# Patient Record
Sex: MALE | Race: WHITE | NOT HISPANIC OR LATINO | Employment: UNEMPLOYED | URBAN - METROPOLITAN AREA
[De-identification: names, ages, dates, MRNs, and addresses within clinical notes are randomized per-mention and may not be internally consistent; named-entity substitution may affect disease eponyms.]

---

## 2021-03-01 ENCOUNTER — RECORDS - HEALTHEAST (OUTPATIENT)
Dept: LAB | Facility: CLINIC | Age: 21
End: 2021-03-01

## 2021-03-01 LAB
C REACTIVE PROTEIN LHE: 0.4 MG/DL (ref 0–0.8)
RHEUMATOID FACT SERPL-ACNC: <15 IU/ML (ref 0–30)

## 2021-03-02 LAB
ANA SER QL: 0.5 U
B BURGDOR IGG+IGM SER QL: 0.07 INDEX VALUE
T PALLIDUM AB SER QL: NEGATIVE

## 2021-03-03 LAB
ACE SERPL-CCNC: 20 U/L (ref 9–67)
B HENSELAE IGG TITR SER IF: NORMAL TITER
B HENSELAE IGM TITR SER IF: NORMAL TITER
B QUINTANA IGG TITR SER IF: NORMAL TITER
B QUINTANA IGM TITR SER IF: NORMAL TITER
GAMMA INTERFERON BACKGROUND BLD IA-ACNC: 0.05 IU/ML
M TB IFN-G BLD-IMP: NEGATIVE
MITOGEN IGNF BCKGRD COR BLD-ACNC: -0.01 IU/ML
MITOGEN IGNF BCKGRD COR BLD-ACNC: 0 IU/ML
QTF INTERPRETATION: NORMAL
QTF MITOGEN - NIL: 8.04 IU/ML

## 2021-03-04 LAB
ANCA IGG TITR SER IF: NORMAL {TITER}
ARUP MISCELLANEOUS TEST: NORMAL

## 2021-03-05 LAB
B LOCUS: NORMAL
B27TEST METHOD: NORMAL
MISCELLANEOUS TEST DEPT. - HE HISTORICAL: NORMAL
PERFORMING LAB: NORMAL
SPECIMEN STATUS: NORMAL
TEST NAME: NORMAL

## 2021-05-26 ENCOUNTER — RECORDS - HEALTHEAST (OUTPATIENT)
Dept: ADMINISTRATIVE | Facility: CLINIC | Age: 21
End: 2021-05-26

## 2021-05-29 ENCOUNTER — RECORDS - HEALTHEAST (OUTPATIENT)
Dept: ADMINISTRATIVE | Facility: CLINIC | Age: 21
End: 2021-05-29

## 2021-06-21 ENCOUNTER — TRANSCRIBE ORDERS (OUTPATIENT)
Dept: OTHER | Age: 21
End: 2021-06-21

## 2021-06-21 DIAGNOSIS — H47.10 OPTIC DISC EDEMA: Primary | ICD-10-CM

## 2021-07-27 ENCOUNTER — OFFICE VISIT (OUTPATIENT)
Dept: OPHTHALMOLOGY | Facility: CLINIC | Age: 21
End: 2021-07-27
Attending: STUDENT IN AN ORGANIZED HEALTH CARE EDUCATION/TRAINING PROGRAM
Payer: COMMERCIAL

## 2021-07-27 DIAGNOSIS — H53.40 VISUAL FIELD DEFECT: Primary | ICD-10-CM

## 2021-07-27 DIAGNOSIS — H53.40 VISUAL FIELD DEFECT: ICD-10-CM

## 2021-07-27 DIAGNOSIS — H47.323 DRUSEN OF BOTH OPTIC DISCS: Primary | ICD-10-CM

## 2021-07-27 PROCEDURE — 92083 EXTENDED VISUAL FIELD XM: CPT | Performed by: OPHTHALMOLOGY

## 2021-07-27 PROCEDURE — 92133 CPTRZD OPH DX IMG PST SGM ON: CPT | Performed by: OPHTHALMOLOGY

## 2021-07-27 PROCEDURE — 99204 OFFICE O/P NEW MOD 45 MIN: CPT | Mod: GC | Performed by: OPHTHALMOLOGY

## 2021-07-27 PROCEDURE — G0463 HOSPITAL OUTPT CLINIC VISIT: HCPCS

## 2021-07-27 ASSESSMENT — VISUAL ACUITY
CORRECTION_TYPE: GLASSES
METHOD: SNELLEN - LINEAR
OS_CC: 20/20
OD_CC: 20/20

## 2021-07-27 ASSESSMENT — EXTERNAL EXAM - RIGHT EYE: OD_EXAM: NORMAL

## 2021-07-27 ASSESSMENT — REFRACTION_WEARINGRX
OD_AXIS: 078
OS_CYLINDER: +1.00
OD_SPHERE: -0.25
SPECS_TYPE: SVL
OS_AXIS: 088
OS_SPHERE: -0.50
OD_CYLINDER: +0.50

## 2021-07-27 ASSESSMENT — TONOMETRY
IOP_METHOD: ICARE
OS_IOP_MMHG: 21
OD_IOP_MMHG: 19

## 2021-07-27 ASSESSMENT — CONF VISUAL FIELD
OD_NORMAL: 1
OS_NORMAL: 1

## 2021-07-27 ASSESSMENT — EXTERNAL EXAM - LEFT EYE: OS_EXAM: NORMAL

## 2021-07-27 ASSESSMENT — SLIT LAMP EXAM - LIDS
COMMENTS: NORMAL
COMMENTS: NORMAL

## 2021-07-27 NOTE — PROGRESS NOTES
Assessment & Plan     Donn Chaudhari is a 20 year old male with the following diagnoses:   1. Drusen of both optic discs    2. Visual field defect         Patient was sent for consultation by Dr. Amrik Ramos for optic disc edema.      HPI:  About 6 months ago, the patient noticed intermittent blurred vision in his left lower vision. He describes this as clouding of the vision, which he was able to clear up with extra focus. He was seen by his home ophthalmologist, who noted left optic disc edema, and referred the patient to vitreoretinal surgeons.  The patient's blurred vision and optic disc edema was initially thought to be secondary to posterior uveitis, but without evidence of cell or flare in the anterior chamber or vitreous.  FAA was completed, which was unremarkable other than possible hyperfluorescence at the disc.  Laboratory testing, including HLA-B27, Lyme, Bartonella, ANCA, ACE, RF, CBC, ESR, CHRISTOFER, CRP, treponema, TB, IgG/IgM, was unremarkable.  MRI brain/orbit with and without contrast was unremarkable.  The patient was started on prednisone 60 mg daily, which resulted in initial improvement of the optic disc edema, which recurred while tapering below 40 mg.  The patient also underwent PSTK, with no improvement of disc edema.  He was therefore referred to neuro-ophthalmology for further evaluation and management.    At this time, the patient endorses stable blurring of his left lower vision.  He denies eye pain at rest or with movement, double vision, changes to color vision, flashes, or floaters.  Any eye problems he also denies recent fevers, change in headaches, weight loss, fatigue, isolated numbness/weakness/tingling, and rashes.  He denies recent injuries.  He denies history of eye conditions other than refractive error with glasses wear.  No history of eye surgeries.    Independent historians:  Patient    Review of outside testing:  -HLA-B27, Lyme, Bartonella, ANCA, ACE, RF, CBC, ESR, CHRISTOFER,  CRP, treponema, TB, IgG/IgM - unremarkable, per VRS note    -MRI brain/orbit - unremarkable    My interpretation performed today of outside testing:  See above    Review of outside clinical notes:  See above    Past medical history:  Migraines, currently on amitriptyline  Depression, previously on fluoxetine (was increased from 10 mg to 20 mg about 1 week prior to onset of symptoms, discontinued about 1 month ago)    Family history / social history:  No glaucoma, AMD, autoimmune conditions  Non-smoker  No alcohol use  No substance use    Exam:  Visual acuity 20/20 both eyes.  Pupils normal, no APD.  Intraocular pressure, extraocular movements, and color vision normal and symmetric.    Tests ordered and interpreted today:  G top:  -Right eye: normal  -Left eye: arcuate defect, MD -5.9, PSD 5.5    OCT RNFL:  -Right eye: all green  -Left eye: unreliable tracing    Discussion of management / interpretation with another provider:   N/A    Assessment/Plan:   He has optic disc drusen both eyes.  His optic nerves are elevated with blurred margins both eyes today.  His ganglion cell layer  Shows loss inferiorly LEFT eye but normal results RIGHT eye  I looked at his MRI images personally.  There is no evidence of optic neuritis on this scan from 3/5/21.  He has not noticed worsening or improvement since onset and there was no pain.  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  At this point, I would recommend observation and follow up 6 months sooner as needed for worsening symptoms.            Attending Physician Attestation:  Complete documentation of historical and exam elements from today's encounter can be found in the full encounter summary report (not reduplicated in this progress note).  I personally obtained the chief complaint(s) and history of present illness.  I confirmed and edited as necessary the review of  systems, past medical/surgical history, family history, social history, and examination findings as documented by others; and I examined the patient myself.  I personally reviewed the relevant tests, images, and reports as documented above.  I formulated and edited as necessary the assessment and plan and discussed the findings and management plan with the patient and family. I personally reviewed the ophthalmic test(s) associated with this encounter, agree with the interpretation(s) as documented by the resident/fellow, and have edited the corresponding report(s) as necessary.  - Chente Biggs MD  Ophthalmology Resident, PGY-3

## 2021-07-27 NOTE — NURSING NOTE
Chief Complaints and History of Present Illnesses   Patient presents with     Blurred Vision Evaluation     Chief Complaint(s) and History of Present Illness(es)     Blurred Vision Evaluation     Laterality: left eye    Onset: 6 months ago              Comments     Pt. States that he has noticed issues with focusing for the last 6 months. Seems as though inferior vision LE is not as clear. No pain or dryness BE. Does have chronic migraines.   Nicol Kimberly COT 2:04 PM July 27, 2021

## 2021-07-27 NOTE — Clinical Note
7/27/2021       RE: Donn Chaudhari  [address] [place] [facility] [address]     Dear Colleague,    Thank you for referring your patient, Donn Chaudhari, to the [facility] at [facility]. Please see a copy of my visit note below.         Assessment & Plan     Donn Chaudhari is a 20 year old male with the following diagnoses:   1. Drusen of both optic discs    2. Visual field defect         Patient was sent for consultation by Dr. Amrik Ramos for optic disc edema.      HPI:  About 6 months ago, the patient noticed intermittent blurred vision in his left lower vision. He describes this as clouding of the vision, which he was able to clear up with extra focus. He was seen by his home ophthalmologist, who noted left optic disc edema, and referred the patient to vitreoretinal surgeons.  The patient's blurred vision and optic disc edema was initially thought to be secondary to posterior uveitis, but without evidence of cell or flare in the anterior chamber or vitreous.  FAA was completed, which was unremarkable other than possible hyperfluorescence at the disc.  Laboratory testing, including HLA-B27, Lyme, Bartonella, ANCA, ACE, RF, CBC, ESR, CHRISTOFER, CRP, treponema, TB, IgG/IgM, was unremarkable.  MRI brain/orbit with and without contrast was unremarkable.  The patient was started on prednisone 60 mg daily, which resulted in initial improvement of the optic disc edema, which recurred while tapering below 40 mg.  The patient also underwent PSTK, with no improvement of disc edema.  He was therefore referred to neuro-ophthalmology for further evaluation and management.    At this time, the patient endorses stable blurring of his left lower vision.  He denies eye pain at rest or with movement, double vision, changes to color vision, flashes, or floaters.  Any eye problems he also denies recent fevers, change in headaches, weight loss, fatigue, isolated  numbness/weakness/tingling, and rashes.  He denies recent injuries.  He denies history of eye conditions other than refractive error with glasses wear.  No history of eye surgeries.    Independent historians:  Patient    Review of outside testing:  -HLA-B27, Lyme, Bartonella, ANCA, ACE, RF, CBC, ESR, CHRISTOFER, CRP, treponema, TB, IgG/IgM - unremarkable, per VRS note    -MRI brain/orbit - unremarkable    My interpretation performed today of outside testing:  See above    Review of outside clinical notes:  See above    Past medical history:  Migraines, currently on amitriptyline  Depression, previously on fluoxetine (was increased from 10 mg to 20 mg about 1 week prior to onset of symptoms, discontinued about 1 month ago)    Family history / social history:  No glaucoma, AMD, autoimmune conditions  Non-smoker  No alcohol use  No substance use    Exam:  Visual acuity 20/20 both eyes.  Pupils normal, no APD.  Intraocular pressure, extraocular movements, and color vision normal and symmetric.    Tests ordered and interpreted today:  G top:  -Right eye: normal  -Left eye: arcuate defect, MD -5.9, PSD 5.5    OCT RNFL:  -Right eye: all green  -Left eye: unreliable tracing    Discussion of management / interpretation with another provider:   N/A    Assessment/Plan:   He has optic disc drusen both eyes.  His optic nerves are elevated with blurred margins both eyes today.  His ganglion cell layer  Shows loss inferiorly LEFT eye but normal results RIGHT eye  I looked at his MRI images personally.  There is no evidence of optic neuritis on this scan from 3/5/21.  He has not noticed worsening or improvement since onset and there was no pain.  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  At this point, I would recommend observation and follow up 6 months sooner as needed for worsening symptoms.            Attending Physician  Attestation:  Complete documentation of historical and exam elements from today's encounter can be found in the full encounter summary report (not reduplicated in this progress note).  I personally obtained the chief complaint(s) and history of present illness.  I confirmed and edited as necessary the review of systems, past medical/surgical history, family history, social history, and examination findings as documented by others; and I examined the patient myself.  I personally reviewed the relevant tests, images, and reports as documented above.  I formulated and edited as necessary the assessment and plan and discussed the findings and management plan with the patient and family. I personally reviewed the ophthalmic test(s) associated with this encounter, agree with the interpretation(s) as documented by the resident/fellow, and have edited the corresponding report(s) as necessary.  - Chente Biggs MD  Ophthalmology Resident, PGY-3          Again, thank you for allowing me to participate in the care of your patient.      Sincerely,    Chente Hernandez MD

## 2021-07-27 NOTE — LETTER
2021         RE:  :  MRN: Donn Chaudhari  2000  [id number]     Dear Dr. Amrik Ramos,    Thank you for asking me to see your very pleasant patient, Donn Chaudhari, in neuro-ophthalmic consultation.  I would like to thank you for sending your records and I have summarized them in the history of present illness.   My assessment and plan are below.  For further details, please see my attached clinic note.      Assessment & Plan     Donn Chaudhari is a 20 year old male with the following diagnoses:   1. Drusen of both optic discs    2. Visual field defect      Patient was sent for consultation by Dr. Amrik Ramos for optic disc edema.      HPI:  About 6 months ago, the patient noticed intermittent blurred vision in his left lower vision. He describes this as clouding of the vision, which he was able to clear up with extra focus. He was seen by his home ophthalmologist, who noted left optic disc edema, and referred the patient to vitreoretinal surgeons.  The patient's blurred vision and optic disc edema was initially thought to be secondary to posterior uveitis, but without evidence of cell or flare in the anterior chamber or vitreous.  FAA was completed, which was unremarkable other than possible hyperfluorescence at the disc.  Laboratory testing, including HLA-B27, Lyme, Bartonella, ANCA, ACE, RF, CBC, ESR, CHRISTOFER, CRP, treponema, TB, IgG/IgM, was unremarkable.  MRI brain/orbit with and without contrast was unremarkable.  The patient was started on prednisone 60 mg daily, which resulted in initial improvement of the optic disc edema, which recurred while tapering below 40 mg.  The patient also underwent PSTK, with no improvement of disc edema.  He was therefore referred to Neuro-Ophthalmology for further evaluation and management.    At this time, the patient endorses stable blurring of his left lower vision.  He denies eye pain at rest or with movement, double vision, changes to color vision, flashes, or  floaters.  Any eye problems he also denies recent fevers, change in headaches, weight loss, fatigue, isolated numbness/weakness/tingling, and rashes.  He denies recent injuries.  He denies history of eye conditions other than refractive error with glasses wear.  No history of eye surgeries.    Independent historians:  Patient  Review of outside testing:  -HLA-B27, Lyme, Bartonella, ANCA, ACE, RF, CBC, ESR, CHRISTOFER, CRP, treponema, TB, IgG/IgM - unremarkable, per VRS note    -MRI brain/orbit - unremarkable    My interpretation performed today of outside testing:  See above    Review of outside clinical notes:  See above    Past medical history:  Migraines, currently on amitriptyline  Depression, previously on fluoxetine (was increased from 10 mg to 20 mg about 1 week prior to onset of symptoms, discontinued about 1 month ago)    Family history / social history:  No glaucoma, AMD, autoimmune conditions  Non-smoker  No alcohol use  No substance use    Exam:  Visual acuity 20/20 both eyes.  Pupils normal, no APD.  Intraocular pressure, extraocular movements, and color vision normal and symmetric.    Tests ordered and interpreted today:  G top:  -Right eye: normal  -Left eye: arcuate defect, MD -5.9, PSD 5.5    OCT RNFL:  -Right eye: all green  -Left eye: unreliable tracing    Discussion of management / interpretation with another provider:   N/A    Assessment/Plan:   He has optic disc drusen both eyes.  His optic nerves are elevated with blurred margins both eyes today.  His ganglion cell layer  Shows loss inferiorly LEFT eye but normal results RIGHT eye  I looked at his MRI images personally.  There is no evidence of optic neuritis on this scan from 3/5/21.  He has not noticed worsening or improvement since onset and there was no pain.  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  At this point, I would recommend  observation and follow up 6 months sooner as needed for worsening symptoms.      Again, thank you for allowing me to participate in the care of your patient.              Sincerely,    Chente Hernandez MD  Professor  Ophthalmology Residency   Director of Neuro-Ophthalmology  [employer] Endowed Chair  Departments of Ophthalmology, Neurology, and Neurosurgery  [facility]   [address] [place]  [address]  T - [phone]  F - [phone]  [email or web address]    CC: Amrik Ramos MD  Vitreoretinal Surgery  [address]  [place] [address]  Via Fax: [phone]     [facility]  [address] [facility] [place] [address]  Via Fax: [phone]     Vinicio Garcia MD  Vitreoretinal Surgery  [address]  [place] [address]  Via In Basket    DX = Anterior ischemic optic neuropathy (AION), optic disc drusen

## 2022-03-01 ENCOUNTER — OFFICE VISIT (OUTPATIENT)
Dept: OPHTHALMOLOGY | Facility: CLINIC | Age: 22
End: 2022-03-01
Attending: OPHTHALMOLOGY
Payer: COMMERCIAL

## 2022-03-01 DIAGNOSIS — H53.40 VISUAL FIELD DEFECT: ICD-10-CM

## 2022-03-01 DIAGNOSIS — H47.10 OPTIC DISC EDEMA: Primary | ICD-10-CM

## 2022-03-01 DIAGNOSIS — H47.323 DRUSEN OF BOTH OPTIC DISCS: ICD-10-CM

## 2022-03-01 DIAGNOSIS — H53.40 VISUAL FIELD DEFECT: Primary | ICD-10-CM

## 2022-03-01 PROCEDURE — 92133 CPTRZD OPH DX IMG PST SGM ON: CPT | Performed by: OPHTHALMOLOGY

## 2022-03-01 PROCEDURE — 92014 COMPRE OPH EXAM EST PT 1/>: CPT | Performed by: OPHTHALMOLOGY

## 2022-03-01 PROCEDURE — 92083 EXTENDED VISUAL FIELD XM: CPT | Performed by: OPHTHALMOLOGY

## 2022-03-01 PROCEDURE — 92015 DETERMINE REFRACTIVE STATE: CPT

## 2022-03-01 PROCEDURE — G0463 HOSPITAL OUTPT CLINIC VISIT: HCPCS | Mod: 25

## 2022-03-01 ASSESSMENT — REFRACTION_WEARINGRX
OD_SPHERE: -0.25
OS_SPHERE: -0.50
OD_CYLINDER: +0.50
OS_AXIS: 088
SPECS_TYPE: SVL
OD_AXIS: 078
OS_CYLINDER: +1.00

## 2022-03-01 ASSESSMENT — EXTERNAL EXAM - LEFT EYE: OS_EXAM: NORMAL

## 2022-03-01 ASSESSMENT — CONF VISUAL FIELD
OS_NORMAL: 1
OD_NORMAL: 1

## 2022-03-01 ASSESSMENT — SLIT LAMP EXAM - LIDS
COMMENTS: NORMAL
COMMENTS: NORMAL

## 2022-03-01 ASSESSMENT — REFRACTION_MANIFEST
OD_SPHERE: -0.25
OS_CYLINDER: +0.75
OS_SPHERE: -0.50
OS_AXIS: 085
OD_CYLINDER: +0.50
OD_AXIS: 090

## 2022-03-01 ASSESSMENT — TONOMETRY
OD_IOP_MMHG: 12
IOP_METHOD: ICARE
OS_IOP_MMHG: 14

## 2022-03-01 ASSESSMENT — VISUAL ACUITY
OS_CC: 20/20
OD_CC: 20/20
METHOD: SNELLEN - LINEAR
CORRECTION_TYPE: GLASSES

## 2022-03-01 ASSESSMENT — CUP TO DISC RATIO
OS_RATIO: 0
OD_RATIO: 0

## 2022-03-01 ASSESSMENT — EXTERNAL EXAM - RIGHT EYE: OD_EXAM: NORMAL

## 2022-03-01 NOTE — PROGRESS NOTES
Assessment & Plan     Donn Chaudhari is a 21 year old male with the following diagnoses:   1. Optic disc edema    2. Visual field defect    3. Drusen of both optic discs       Patient was last seen on 7/27/21 for evaluation of left optic disc edema.  Initially thought to be secondary to posterior uveitis, but without evidence of cell or flare in the anterior chamber or vitreous.  FAA was completed, which was unremarkable other than possible hyperfluorescence at the disc.  Laboratory testing, including HLA-B27, Lyme, Bartonella, ANCA, ACE, RF, CBC, ESR, CHRISTOFER, CRP, treponema, TB, IgG/IgM, was unremarkable.  MRI brain/orbit was unremarkable.  The patient was started on prednisone 60 mg daily, which resulted in initial improvement of the optic disc edema, which recurred while tapering below 40 mg.  The patient also underwent PSTK, with no improvement of disc edema.  At the time of my last visit with him the patient had optic nerve elevation with blurred margins on both eyes in both eyes.  He had optic disc drusen in both eyes.  OCT showed ganglion cell layer loss inferiorly left eye and normal right eye.  I felt the most likely diagnosis was anterior ischemic optic neuropathy left eye secondary to optic disc drusen.  He does not think his vision has changed at all.      Today vision is 20/20 in both eyes, without APD, full color vision on Ishihara plates, and full extraocular movements.     Stable and normal visual function RIGHT eye but retinal nerve fiber layer normal and has improved quite a bit from the last visit.  He most likely had optic disc edema in the RIGHT eye last visit.        It is my impression that patient has optic atrophy left eye.  He likely had Anterior ischemic optic neuropathy (AION) secondary to drusen in the LEFT eye.  He has relatively well preserved vision with only mild peripheral vision loss.       Follow up 1 year sooner as needed for worsening symptoms with visual field and optical  coherence tomography.           Attending Physician Attestation:  Complete documentation of historical and exam elements from today's encounter can be found in the full encounter summary report (not reduplicated in this progress note).  I personally obtained the chief complaint(s) and history of present illness.  I confirmed and edited as necessary the review of systems, past medical/surgical history, family history, social history, and examination findings as documented by others; and I examined the patient myself.  I personally reviewed the relevant tests, images, and reports as documented above.  I formulated and edited as necessary the assessment and plan and discussed the findings and management plan with the patient and family. - Chente Hernandez MD

## 2022-03-01 NOTE — NURSING NOTE
Chief Complaints and History of Present Illnesses   Patient presents with     Optic Nerve Drusen Follow Up     Chief Complaint(s) and History of Present Illness(es)     Optic Nerve Drusen Follow Up               Comments     Donn Chaudhari is a 21 year old male with the following diagnoses:   1. Drusen of both optic discs   2. Visual field defect       No significant change in vision since the last visit.     Mohini LOPEZ 8:14 AM March 1, 2022

## 2022-03-01 NOTE — LETTER
3/1/2022       RE: Donn Chaudhari  [address] [facility] [address]     Dear Dr. Ramos,    Thank you for asking me to see your patient, Donn Chaudhari , in neuro-ophthalmic consultation.  I would like to thank you for sending your records and I have summarized them in the history of present illness.  My assessment and plan are below.  For further details, please see my attached clinic note.      Assessment & Plan     Donn Chaudhari is a 21 year old male with the following diagnoses:   1. Optic disc edema    2. Visual field defect    3. Drusen of both optic discs       Patient was last seen on 7/27/21 for evaluation of left optic disc edema.  Initially thought to be secondary to posterior uveitis, but without evidence of cell or flare in the anterior chamber or vitreous.  FAA was completed, which was unremarkable other than possible hyperfluorescence at the disc.  Laboratory testing, including HLA-B27, Lyme, Bartonella, ANCA, ACE, RF, CBC, ESR, CHRISTOFER, CRP, treponema, TB, IgG/IgM, was unremarkable.  MRI brain/orbit was unremarkable.  The patient was started on prednisone 60 mg daily, which resulted in initial improvement of the optic disc edema, which recurred while tapering below 40 mg.  The patient also underwent PSTK, with no improvement of disc edema.  At the time of my last visit with him the patient had optic nerve elevation with blurred margins on both eyes in both eyes.  He had optic disc drusen in both eyes.  OCT showed ganglion cell layer loss inferiorly left eye and normal right eye.  I felt the most likely diagnosis was anterior ischemic optic neuropathy left eye secondary to optic disc drusen.  He does not think his vision has changed at all.      Today vision is 20/20 in both eyes, without APD, full color vision on Ishihara plates, and full extraocular movements.     Stable and normal visual function RIGHT eye but retinal nerve fiber layer normal and has improved quite a bit from the last visit.  He most  likely had optic disc edema in the RIGHT eye last visit.        It is my impression that patient has optic atrophy left eye.  He likely had Anterior ischemic optic neuropathy (AION) secondary to drusen in the LEFT eye.  He has relatively well preserved vision with only mild peripheral vision loss.     Follow up 1 year sooner as needed for worsening symptoms with visual field and optical coherence tomography.    Attending Physician Attestation:  Complete documentation of historical and exam elements from today's encounter can be found in the full encounter summary report (not reduplicated in this progress note).  I personally obtained the chief complaint(s) and history of present illness.  I confirmed and edited as necessary the review of systems, past medical/surgical history, family history, social history, and examination findings as documented by others; and I examined the patient myself.  I personally reviewed the relevant tests, images, and reports as documented above.  I formulated and edited as necessary the assessment and plan and discussed the findings and management plan with the patient and family. - Chente Hernandez MD      Again, thank you for allowing me to participate in the care of your patient.      Sincerely,    Chente Hernandez MD  Professor  Ophthalmology Residency   Director of Neuro-Ophthalmology  [employer] Endowed Chair  Departments of Ophthalmology, Neurology, and Neurosurgery  [facility] [address] [place] [address]  [address]  T - [phone]  F - [phone]  [email or web address]

## 2022-04-07 ENCOUNTER — LAB REQUISITION (OUTPATIENT)
Dept: LAB | Facility: CLINIC | Age: 22
End: 2022-04-07
Payer: COMMERCIAL

## 2022-04-07 DIAGNOSIS — R45.89 OTHER SYMPTOMS AND SIGNS INVOLVING EMOTIONAL STATE: ICD-10-CM

## 2022-04-07 LAB — TSH SERPL DL<=0.005 MIU/L-ACNC: 1.54 UIU/ML (ref 0.3–5)

## 2022-04-07 PROCEDURE — 82306 VITAMIN D 25 HYDROXY: CPT | Mod: ORL | Performed by: PEDIATRICS

## 2022-04-07 PROCEDURE — 84443 ASSAY THYROID STIM HORMONE: CPT | Mod: ORL | Performed by: PEDIATRICS

## 2022-04-08 LAB — DEPRECATED CALCIDIOL+CALCIFEROL SERPL-MC: 11 UG/L (ref 20–75)

## 2022-09-12 ENCOUNTER — HOSPITAL ENCOUNTER (EMERGENCY)
Facility: CLINIC | Age: 22
Discharge: LEFT BEFORE DISCHARGE | End: 2022-09-13
Payer: COMMERCIAL

## 2022-09-12 VITALS — WEIGHT: 155 LBS | BODY MASS INDEX: 22.96 KG/M2 | HEIGHT: 69 IN

## 2022-09-13 ENCOUNTER — HOSPITAL ENCOUNTER (EMERGENCY)
Facility: CLINIC | Age: 22
Discharge: HOME | End: 2022-09-13
Attending: EMERGENCY MEDICINE | Admitting: EMERGENCY MEDICINE
Payer: COMMERCIAL

## 2022-09-13 ENCOUNTER — APPOINTMENT (OUTPATIENT)
Dept: CT IMAGING | Facility: CLINIC | Age: 22
End: 2022-09-13
Attending: EMERGENCY MEDICINE
Payer: COMMERCIAL

## 2022-09-13 VITALS
TEMPERATURE: 98 F | HEART RATE: 96 BPM | OXYGEN SATURATION: 99 % | WEIGHT: 155 LBS | RESPIRATION RATE: 16 BRPM | SYSTOLIC BLOOD PRESSURE: 108 MMHG | BODY MASS INDEX: 22.96 KG/M2 | HEIGHT: 69 IN | DIASTOLIC BLOOD PRESSURE: 59 MMHG

## 2022-09-13 DIAGNOSIS — G43.809 OTHER MIGRAINE WITHOUT STATUS MIGRAINOSUS, NOT INTRACTABLE: ICD-10-CM

## 2022-09-13 LAB
ANION GAP SERPL CALCULATED.3IONS-SCNC: 13 MMOL/L (ref 5–18)
BASOPHILS # BLD AUTO: 0 10E3/UL (ref 0–0.2)
BASOPHILS NFR BLD AUTO: 0 %
BUN SERPL-MCNC: 11 MG/DL (ref 8–22)
C REACTIVE PROTEIN LHE: 0.2 MG/DL (ref 0–?)
CALCIUM SERPL-MCNC: 10 MG/DL (ref 8.5–10.5)
CHLORIDE BLD-SCNC: 105 MMOL/L (ref 98–107)
CO2 SERPL-SCNC: 21 MMOL/L (ref 22–31)
CREAT SERPL-MCNC: 0.92 MG/DL (ref 0.7–1.3)
EOSINOPHIL # BLD AUTO: 0 10E3/UL (ref 0–0.7)
EOSINOPHIL NFR BLD AUTO: 0 %
ERYTHROCYTE [DISTWIDTH] IN BLOOD BY AUTOMATED COUNT: 11.7 % (ref 10–15)
GFR SERPL CREATININE-BSD FRML MDRD: >90 ML/MIN/1.73M2
GLUCOSE BLD-MCNC: 74 MG/DL (ref 70–125)
HCT VFR BLD AUTO: 45.6 % (ref 40–53)
HGB BLD-MCNC: 16 G/DL (ref 13.3–17.7)
IMM GRANULOCYTES # BLD: 0 10E3/UL
IMM GRANULOCYTES NFR BLD: 0 %
INR PPP: 1.06 (ref 0.85–1.15)
LYMPHOCYTES # BLD AUTO: 2.3 10E3/UL (ref 0.8–5.3)
LYMPHOCYTES NFR BLD AUTO: 19 %
MCH RBC QN AUTO: 30.3 PG (ref 26.5–33)
MCHC RBC AUTO-ENTMCNC: 35.1 G/DL (ref 31.5–36.5)
MCV RBC AUTO: 86 FL (ref 78–100)
MONOCYTES # BLD AUTO: 0.7 10E3/UL (ref 0–1.3)
MONOCYTES NFR BLD AUTO: 6 %
NEUTROPHILS # BLD AUTO: 9.1 10E3/UL (ref 1.6–8.3)
NEUTROPHILS NFR BLD AUTO: 75 %
NRBC # BLD AUTO: 0 10E3/UL
NRBC BLD AUTO-RTO: 0 /100
PLATELET # BLD AUTO: 316 10E3/UL (ref 150–450)
POTASSIUM BLD-SCNC: 4.1 MMOL/L (ref 3.5–5)
RBC # BLD AUTO: 5.28 10E6/UL (ref 4.4–5.9)
SODIUM SERPL-SCNC: 139 MMOL/L (ref 136–145)
WBC # BLD AUTO: 12.1 10E3/UL (ref 4–11)

## 2022-09-13 PROCEDURE — 82310 ASSAY OF CALCIUM: CPT | Performed by: EMERGENCY MEDICINE

## 2022-09-13 PROCEDURE — 96361 HYDRATE IV INFUSION ADD-ON: CPT

## 2022-09-13 PROCEDURE — 96374 THER/PROPH/DIAG INJ IV PUSH: CPT

## 2022-09-13 PROCEDURE — 258N000003 HC RX IP 258 OP 636: Performed by: EMERGENCY MEDICINE

## 2022-09-13 PROCEDURE — 36415 COLL VENOUS BLD VENIPUNCTURE: CPT | Performed by: EMERGENCY MEDICINE

## 2022-09-13 PROCEDURE — 96375 TX/PRO/DX INJ NEW DRUG ADDON: CPT

## 2022-09-13 PROCEDURE — 86140 C-REACTIVE PROTEIN: CPT | Performed by: EMERGENCY MEDICINE

## 2022-09-13 PROCEDURE — 85014 HEMATOCRIT: CPT | Performed by: EMERGENCY MEDICINE

## 2022-09-13 PROCEDURE — 70450 CT HEAD/BRAIN W/O DYE: CPT

## 2022-09-13 PROCEDURE — 85610 PROTHROMBIN TIME: CPT | Performed by: EMERGENCY MEDICINE

## 2022-09-13 PROCEDURE — 250N000011 HC RX IP 250 OP 636: Performed by: EMERGENCY MEDICINE

## 2022-09-13 PROCEDURE — 99285 EMERGENCY DEPT VISIT HI MDM: CPT | Mod: 25

## 2022-09-13 RX ORDER — KETOROLAC TROMETHAMINE 15 MG/ML
15 INJECTION, SOLUTION INTRAMUSCULAR; INTRAVENOUS ONCE
Status: COMPLETED | OUTPATIENT
Start: 2022-09-13 | End: 2022-09-13

## 2022-09-13 RX ORDER — DIPHENHYDRAMINE HYDROCHLORIDE 50 MG/ML
50 INJECTION INTRAMUSCULAR; INTRAVENOUS ONCE
Status: COMPLETED | OUTPATIENT
Start: 2022-09-13 | End: 2022-09-13

## 2022-09-13 RX ADMIN — PROCHLORPERAZINE EDISYLATE 10 MG: 5 INJECTION INTRAMUSCULAR; INTRAVENOUS at 01:15

## 2022-09-13 RX ADMIN — DIPHENHYDRAMINE HYDROCHLORIDE 50 MG: 50 INJECTION, SOLUTION INTRAMUSCULAR; INTRAVENOUS at 01:10

## 2022-09-13 RX ADMIN — SODIUM CHLORIDE 1000 ML: 9 INJECTION, SOLUTION INTRAVENOUS at 01:08

## 2022-09-13 RX ADMIN — KETOROLAC TROMETHAMINE 15 MG: 15 INJECTION, SOLUTION INTRAMUSCULAR; INTRAVENOUS at 01:13

## 2022-09-13 NOTE — ED TRIAGE NOTES
Migraine for >12 hours, switched medications and tizanidine is not working, was on amitriptyline. Migraine came on typically but escalated and not improving.  Pt reports it goes away briefly but comes back worse.     Triage Assessment     Row Name 09/13/22 0033       Triage Assessment (Adult)    Airway WDL WDL       Respiratory WDL    Respiratory WDL WDL       Skin Circulation/Temperature WDL    Skin Circulation/Temperature WDL WDL       Cardiac WDL    Cardiac WDL WDL       Peripheral/Neurovascular WDL    Peripheral Neurovascular WDL WDL       Cognitive/Neuro/Behavioral WDL    Cognitive/Neuro/Behavioral WDL WDL

## 2022-09-13 NOTE — ED PROVIDER NOTES
EMERGENCY DEPARTMENT ENCOUnter      NAME: Donn Chaudhari  AGE: 21 year old male  YOB: 2000  MRN: [id number]  EVALUATION DATE & TIME: No admission date for patient encounter.    PCP: Trish Mendez    ED PROVIDER: Desiree Harding MD      Chief Complaint   Patient presents with     Headache         FINAL IMPRESSION:  1. Other migraine without status migrainosus, not intractable          ED COURSE & MEDICAL DECISION MAKING:    In summary, the patient is a 21-year-old the presents to the emergency department for evaluation of a headache thought secondary to a migraine headache.  He has no evidence of anything more serious like subarachnoid hemorrhage or tumor.  Symptoms were much improved in the emergency department.  We will discharge with close outpatient follow-up.  0040  I met with the patient, obtained history, performed an initial exam, and discussed options and plan for diagnostics and treatment here in the ED. previous medical records were reviewed.  0146 Reassessed patient. Patient reports feeling better. Headache has improved to 1/10 in intensity.   0203 Rechecked patient. I discussed the plan for discharge with the patient, and patient is agreeable. We discussed supportive cares at home and reasons for return to the ER including new or worsening symptoms - all questions and concerns addressed. Patient to be discharged by RN.      At the conclusion of the encounter I discussed the results of all of the tests and the disposition. The questions were answered. The patient or family acknowledged understanding and was agreeable with the care plan.         MEDICATIONS GIVEN IN THE EMERGENCY:  Medications   0.9% sodium chloride BOLUS (0 mLs Intravenous Stopped 9/13/22 0202)   prochlorperazine (COMPAZINE) injection 10 mg (10 mg Intravenous Given 9/13/22 0115)   diphenhydrAMINE (BENADRYL) injection 50 mg (50 mg Intravenous Given 9/13/22 0110)   ketorolac (TORADOL) injection 15 mg (15 mg Intravenous  Given 9/13/22 0113)       NEW PRESCRIPTIONS STARTED AT TODAY'S ER VISIT  Discharge Medication List as of 9/13/2022  2:03 AM             =================================================================    HPI        Donn Chaudhari is a 21 year old male with a pertinent history of migraines and Drusen syndrome (per patient) who presents to this ED by walk in for evaluation of headache. Patient presents with a left-sided migraine that radiates to the back of the head for the past 12 hours. Headache is 9/10 in intensity with associated photophobia, nausea, and vomiting. He reports history of migraines and notes that this is the worst one yet. Denies any palliating or provoking factors. Denies fever or chills.Notes history of Drusen Syndrome. Denies allergies to medications or COVID-19 exposure. Patient denies any additional complaints at this time.     Patient denies alcohol or tobacco use.     REVIEW OF SYSTEMS     Constitutional:  Denies fever or chills  HENT:  Denies sore throat. Endorses photophobia.   Respiratory:  Denies cough or shortness of breath   Cardiovascular:  Denies chest pain or palpitations  GI:  Denies abdominal pain. Endorses nausea and vomiting.   Musculoskeletal:  Denies any new extremity pain   Skin:  Denies rash   Neurologic:  Denies focal weakness or sensory changes. Endorses headache.    All other systems reviewed and are negative      PAST MEDICAL HISTORY:  History reviewed. No pertinent past medical history.    PAST SURGICAL HISTORY:  History reviewed. No pertinent surgical history.        CURRENT MEDICATIONS:    dexamethasone (DEXAMETHASONE) 1 mg/mL Drop        ALLERGIES:  No Known Allergies    FAMILY HISTORY:  History reviewed. No pertinent family history.    SOCIAL HISTORY:   Social History     Socioeconomic History     Marital status: Single     Spouse name: None     Number of children: None     Years of education: None     Highest education level: None       PHYSICAL EXAM     Constitutional:  Well developed, Well nourished,  HENT:  Normocephalic, Atraumatic, Bilateral external ears normal, Oropharynx moist, Nose normal.   Neck:  Normal range of motion, No meningismus, No stridor.   Eyes:  EOMI, Conjunctiva normal, No discharge.   Respiratory:  Normal breath sounds, No respiratory distress, No wheezing, No chest tenderness.   Cardiovascular:  Normal heart rate, Normal rhythm, No murmurs  GI:  Soft, No tenderness, No guarding, No CVA tenderness.   Musculoskeletal:  Neurovascularly intact distally, No edema, No tenderness, No cyanosis, Good range of motion in all major joints.   Integument:  Warm, Dry, No erythema, No rash.   Lymphatic:  No lymphadenopathy noted.   Neurologic:  Alert & oriented x 3, Normal motor function, Normal sensory function, No focal deficits noted.   Psychiatric:  Affect normal, Judgment normal, Mood normal.      LAB:  All pertinent labs reviewed and interpreted.  Results for orders placed or performed during the hospital encounter of 09/13/22   Head CT w/o contrast    Impression    IMPRESSION:  1.  No acute intracranial process.   Result Value Ref Range    INR 1.06 0.85 - 1.15   Basic metabolic panel   Result Value Ref Range    Sodium 139 136 - 145 mmol/L    Potassium 4.1 3.5 - 5.0 mmol/L    Chloride 105 98 - 107 mmol/L    Carbon Dioxide (CO2) 21 (L) 22 - 31 mmol/L    Anion Gap 13 5 - 18 mmol/L    Urea Nitrogen 11 8 - 22 mg/dL    Creatinine 0.92 0.70 - 1.30 mg/dL    Calcium 10.0 8.5 - 10.5 mg/dL    Glucose 74 70 - 125 mg/dL    GFR Estimate >90 >60 mL/min/1.73m2   CRP inflammation   Result Value Ref Range    CRP 0.2 0.0 - <0.8 mg/dL   CBC with platelets and differential   Result Value Ref Range    WBC Count 12.1 (H) 4.0 - 11.0 10e3/uL    RBC Count 5.28 4.40 - 5.90 10e6/uL    Hemoglobin 16.0 13.3 - 17.7 g/dL    Hematocrit 45.6 40.0 - 53.0 %    MCV 86 78 - 100 fL    MCH 30.3 26.5 - 33.0 pg    MCHC 35.1 31.5 - 36.5 g/dL    RDW 11.7 10.0 - 15.0 %    Platelet Count 316  150 - 450 10e3/uL    % Neutrophils 75 %    % Lymphocytes 19 %    % Monocytes 6 %    % Eosinophils 0 %    % Basophils 0 %    % Immature Granulocytes 0 %    NRBCs per 100 WBC 0 <1 /100    Absolute Neutrophils 9.1 (H) 1.6 - 8.3 10e3/uL    Absolute Lymphocytes 2.3 0.8 - 5.3 10e3/uL    Absolute Monocytes 0.7 0.0 - 1.3 10e3/uL    Absolute Eosinophils 0.0 0.0 - 0.7 10e3/uL    Absolute Basophils 0.0 0.0 - 0.2 10e3/uL    Absolute Immature Granulocytes 0.0 <=0.4 10e3/uL    Absolute NRBCs 0.0 10e3/uL       RADIOLOGY:  I have independently reviewed and interpreted the above imaging, pending the final radiology read.  Head CT w/o contrast   Final Result   IMPRESSION:   1.  No acute intracranial process.                  I, Christopher Falcon, am serving as a scribe to document services personally performed by Dr. Harding based on my observation and the provider's statements to me. I, Desiree Harding MD attest that Christopher Falcon is acting in a scribe capacity, has observed my performance of the services and has documented them in accordance with my direction.    Desiree Harding MD  Emergency Medicine  [facility] EMERGENCY ROOM  [address] [id number] [phone]  Dept: [phone]     Desiree Harding MD  09/14/22 0702

## 2022-09-13 NOTE — PROGRESS NOTES
Patient requested that his IV be taken out. He reported that he no longer wanted to wait. IV dc'd and patient left with father.

## 2022-09-13 NOTE — PROGRESS NOTES
IV started. Patient placed back into waiting room due to emergency room capacity. Patient instructed to inform the staff if symptoms change or if patient decides to leave. Patient verbalizes understanding.

## 2022-09-13 NOTE — ED TRIAGE NOTES
"  Patient reports that he started with a severe headache around noon today. He reports that this is the \"worst migraine ever had.\" patient reports that they feel like his normal headaches just worse. Patient reports that he is in the middle of changing up his medications.     "

## 2022-09-13 NOTE — DISCHARGE INSTRUCTIONS
Continue your medications as previously prescribed  Return to the emergency department for worsening problems or concerns

## 2022-12-12 ENCOUNTER — HOSPITAL ENCOUNTER (EMERGENCY)
Facility: CLINIC | Age: 22
Discharge: HOME | End: 2022-12-12
Attending: EMERGENCY MEDICINE | Admitting: EMERGENCY MEDICINE
Payer: COMMERCIAL

## 2022-12-12 VITALS
TEMPERATURE: 100.3 F | BODY MASS INDEX: 19.99 KG/M2 | HEART RATE: 123 BPM | RESPIRATION RATE: 18 BRPM | WEIGHT: 135 LBS | SYSTOLIC BLOOD PRESSURE: 146 MMHG | DIASTOLIC BLOOD PRESSURE: 93 MMHG | HEIGHT: 69 IN | OXYGEN SATURATION: 98 %

## 2022-12-12 DIAGNOSIS — U07.1 INFECTION DUE TO 2019 NOVEL CORONAVIRUS: ICD-10-CM

## 2022-12-12 LAB
DEPRECATED S PYO AG THROAT QL EIA: NEGATIVE
FLUAV RNA SPEC QL NAA+PROBE: NEGATIVE
FLUBV RNA RESP QL NAA+PROBE: NEGATIVE
GROUP A STREP BY PCR: NOT DETECTED
RSV RNA SPEC NAA+PROBE: NEGATIVE
SARS-COV-2 RNA RESP QL NAA+PROBE: POSITIVE

## 2022-12-12 PROCEDURE — C9803 HOPD COVID-19 SPEC COLLECT: HCPCS

## 2022-12-12 PROCEDURE — 87651 STREP A DNA AMP PROBE: CPT | Performed by: EMERGENCY MEDICINE

## 2022-12-12 PROCEDURE — 99283 EMERGENCY DEPT VISIT LOW MDM: CPT | Mod: CS

## 2022-12-12 PROCEDURE — 87637 SARSCOV2&INF A&B&RSV AMP PRB: CPT | Performed by: EMERGENCY MEDICINE

## 2022-12-12 RX ORDER — PREDNISOLONE 15 MG/5 ML
SOLUTION, ORAL ORAL
Qty: 105 ML | Refills: 0 | Status: SHIPPED | OUTPATIENT
Start: 2022-12-12

## 2022-12-12 ASSESSMENT — ENCOUNTER SYMPTOMS
SORE THROAT: 1
COUGH: 1
FEVER: 1

## 2022-12-13 NOTE — ED PROVIDER NOTES
EMERGENCY DEPARTMENT ENCOUNTER      NAME: Donn Chaudhari  AGE: 22 year old male  YOB: 2000  MRN: [id number]  EVALUATION DATE & TIME: No admission date for patient encounter.    PCP: Sanford Mendez    ED PROVIDER: Sanford Dickerson M.D.      Chief Complaint   Patient presents with     Pharyngitis         FINAL IMPRESSION:  1.  Acute COVID-19 infection.      ED COURSE & MEDICAL DECISION MAKIN:58 PM I met with the patient to gather history and to perform my initial exam. We discussed plans for the ED course, including diagnostic testing and treatment. PPE worn: cloth mask.  Patient sick for 3 days with fever, achiness, tingling sensation, sore throat, cough.  Testing ordered from triage shows a negative strep test, negative influenza and RSV but positive for COVID-19.  Patient was vaccinated.  He has a known exposure to influenza type a with his brother.  Testing for that was negative today.  Patient will be discharged home.  Prescription for Prelone as patient has a history of gagging or aspirating well when he is sick, but not 1 healthy.  I did recommend he follow-up with either pulmonary medicine or GI if this problem persists recurrently.        Pertinent Labs & Imaging studies reviewed. (See chart for details)    22 year old male presents to the Emergency Department for evaluation of acute viral symptoms.    At the conclusion of the encounter I discussed the results of all of the tests and the disposition. The questions were answered. The patient or family acknowledged understanding and was agreeable with the care plan.      Medical Decision Making    History:    Supplemental history from: N/A    External Record(s) reviewed: Inpatient Record: Computer inpatient records reviewed.    Work Up:    Chart documentation includes differential considered and any EKGs or imaging interpreted by provider.  Possibly includes nonspecific virus, strep throat, influenza, COVID, other infection.    In additional  to work up documented, I considered the following work up: See chart documentation, if applicable.    External consultation:    Discussion of management with another provider: See chart documentation, if applicable    Complicating factors:    Care impacted by chronic illness: None    Care affected by social determinants of health: N/A    Disposition considerations: Discharged home with prescription for Prelone.        MEDICATIONS GIVEN IN THE EMERGENCY:  Medications - No data to display    NEW PRESCRIPTIONS STARTED AT TODAY'S ER VISIT  New Prescriptions    No medications on file          =================================================================    HPI    Patient information was obtained from: patient    Use of : N/A        Donn Chaudhari is a 22 year old male with no pertinent history who presents to this ED via walk-in with family member for evaluation of sore throat.    Patient reports he has been in close contact with his brother who has influenza. Since then, he reports feeling flu-like symptoms for the past 3 days with sore throat, cough, congestion, generalized body aches, and low grade fever. He states that when previously sick, he had to sleep vertically due to congestion and to avoid choking, gagging, or aspirating.    He does not identify any waxing or waning symptoms otherwise, exacerbating or alleviating features,associated symptoms except as mentioned. He denies any pain related complaints.    REVIEW OF SYSTEMS   Review of Systems   Constitutional: Positive for fever (low grade).   HENT: Positive for congestion and sore throat.    Respiratory: Positive for cough.    Musculoskeletal:        Endorses generalized body aches   All other systems reviewed and are negative.      PAST MEDICAL HISTORY:  History reviewed. No pertinent past medical history.    PAST SURGICAL HISTORY:  History reviewed. No pertinent surgical history.        CURRENT MEDICATIONS:    dexamethasone (DEXAMETHASONE) 1  "mg/mL Drop        ALLERGIES:  No Known Allergies    FAMILY HISTORY:  History reviewed. No pertinent family history.    SOCIAL HISTORY:   Social History     Socioeconomic History     Marital status: Single     Spouse name: None     Number of children: None     Years of education: None     Highest education level: None   No drugs, alcohol, or tobacco.    VITALS:  BP (!) 146/93   Pulse (!) 123   Temp 100.3  F (37.9  C) (Temporal)   Resp 18   Ht 1.753 m (5' 9\")   Wt 61.2 kg (135 lb)   SpO2 98%   BMI 19.94 kg/m      PHYSICAL EXAM    Vital Signs:  BP (!) 146/93   Pulse (!) 123   Temp 100.3  F (37.9  C) (Temporal)   Resp 18   Ht 1.753 m (5' 9\")   Wt 61.2 kg (135 lb)   SpO2 98%   BMI 19.94 kg/m    General:  On entering the room is in no apparent distress.    Oropharynx reddened and minimal swelling but no exudate.  No cervical lymphadenopathy.  Neck supple forage motion nontender.  Chest clear to auscultation without rhonchi, without rales or wheezes.  Cardiac with regular rate and rhythm without murmur, rubs or gallops slightly fast at 106.  Abdomen soft and nontender.  Patient alert and oriented was all extremity symmetrically.       LAB:  All pertinent labs reviewed and interpreted.  Labs Ordered and Resulted from Time of ED Arrival to Time of ED Departure   INFLUENZA A/B & SARS-COV2 PCR MULTIPLEX - Abnormal       Result Value    Influenza A PCR Negative      Influenza B PCR Negative      RSV PCR Negative      SARS CoV2 PCR Positive (*)    STREPTOCOCCUS A RAPID SCREEN W REFELX TO PCR - Normal    Group A Strep antigen Negative     GROUP A STREPTOCOCCUS PCR THROAT SWAB       RADIOLOGY:  Reviewed all pertinent imaging. Please see official radiology report.  No orders to display              EKG:        I, Jennifer Cobos, am serving as a scribe to document services personally performed by Dr. Dickerson based on my observation and the provider's statements to me. I, Sanford Dickerson MD attest that Jennifer Cobos is acting in a " scrib[facility], has observed my performance of the services and has documented them in accordance with my direction.    Sanford Dickerson M.D.  Emergency Medicine  [facility] EMERGENCY ROOM  [address] [id number]  [phone]  Dept: [phone]     Sanford Dickerson MD  12/12/22 2013

## 2022-12-13 NOTE — DISCHARGE INSTRUCTIONS
Encourage rest and fluids.  Prelone as prescribed: 15 mL daily until gone.  Tylenol or ibuprofen every 6 hours as needed for fever, achiness, etc.  Expect to be sick with COVID for 7 to 10 days from symptom onset.  Quarantine at home.  Good mask wearing, handwashing, etc.  Would recommend getting the influenza vaccine as soon as possible.  See your doctor if not getting better in the next week.  See them sooner if worse or problems.

## 2023-06-01 ENCOUNTER — OFFICE VISIT (OUTPATIENT)
Dept: OPHTHALMOLOGY | Facility: CLINIC | Age: 23
End: 2023-06-01
Attending: OPHTHALMOLOGY
Payer: COMMERCIAL

## 2023-06-01 DIAGNOSIS — H53.40 VISUAL FIELD DEFECT: Primary | ICD-10-CM

## 2023-06-01 DIAGNOSIS — H53.40 VISUAL FIELD DEFECT: ICD-10-CM

## 2023-06-01 PROCEDURE — 92014 COMPRE OPH EXAM EST PT 1/>: CPT | Performed by: OPHTHALMOLOGY

## 2023-06-01 PROCEDURE — 92133 CPTRZD OPH DX IMG PST SGM ON: CPT | Mod: 26 | Performed by: OPHTHALMOLOGY

## 2023-06-01 PROCEDURE — 92133 CPTRZD OPH DX IMG PST SGM ON: CPT | Performed by: OPHTHALMOLOGY

## 2023-06-01 PROCEDURE — 92083 EXTENDED VISUAL FIELD XM: CPT | Performed by: OPHTHALMOLOGY

## 2023-06-01 PROCEDURE — G0463 HOSPITAL OUTPT CLINIC VISIT: HCPCS | Performed by: OPHTHALMOLOGY

## 2023-06-01 ASSESSMENT — EXTERNAL EXAM - RIGHT EYE: OD_EXAM: NORMAL

## 2023-06-01 ASSESSMENT — CONF VISUAL FIELD
OS_SUPERIOR_NASAL_RESTRICTION: 0
OS_INFERIOR_TEMPORAL_RESTRICTION: 0
OS_INFERIOR_NASAL_RESTRICTION: 0
OD_NORMAL: 1
OS_NORMAL: 1
METHOD: COUNTING FINGERS
OD_INFERIOR_TEMPORAL_RESTRICTION: 0
OD_SUPERIOR_TEMPORAL_RESTRICTION: 0
OS_SUPERIOR_TEMPORAL_RESTRICTION: 0
OD_SUPERIOR_NASAL_RESTRICTION: 0
OD_INFERIOR_NASAL_RESTRICTION: 0

## 2023-06-01 ASSESSMENT — TONOMETRY
IOP_METHOD: ICARE
OD_IOP_MMHG: 12
OS_IOP_MMHG: 13

## 2023-06-01 ASSESSMENT — REFRACTION_WEARINGRX
OD_SPHERE: -0.25
OS_AXIS: 088
OS_CYLINDER: +1.00
OS_SPHERE: -0.50
OD_CYLINDER: +0.50
SPECS_TYPE: SVL
OD_AXIS: 078

## 2023-06-01 ASSESSMENT — SLIT LAMP EXAM - LIDS
COMMENTS: NORMAL
COMMENTS: NORMAL

## 2023-06-01 ASSESSMENT — VISUAL ACUITY
METHOD: SNELLEN - LINEAR
OD_CC: 20/20
OS_CC: 20/20
OS_CC+: -1
CORRECTION_TYPE: GLASSES

## 2023-06-01 ASSESSMENT — EXTERNAL EXAM - LEFT EYE: OS_EXAM: NORMAL

## 2023-06-01 NOTE — NURSING NOTE
Chief Complaint(s) and History of Present Illness(es)     Annual Eye Exam    In both eyes.  Since onset it is stable.  Associated symptoms include Negative for eye pain, headache, flashes and floaters.  Pain was noted as 0/10. Additional comments: Donn Chaudhari is a 22 year old male with the following diagnoses:   1. Optic disc edema   2. Visual field defect   3. Drusen of both optic discs     Donn reports no new vision changes.  Vision remains the same.  No eye pain.  Hx of migraines.    JESSICA Angel 6/1/2023 10:34 AM

## 2023-06-01 NOTE — PROGRESS NOTES
HPI    22 year old male with a past history of optic disc edema, referred by Dr. Ramos in 2021. 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  He was recommended to follow up 6 months sooner as needed for worsening symptoms.     The patient's blurred vision and optic disc edema was initially thought to be secondary to posterior uveitis, but without evidence of cell or flare in the anterior chamber or vitreous.  FAA was completed, which was unremarkable other than possible hyperfluorescence at the disc.  Laboratory testing, including HLA-B27, Lyme, Bartonella, ANCA, ACE, RF, CBC, ESR, CHRISTOFER, CRP, treponema, TB, IgG/IgM, was unremarkable.  MRI brain/orbit with and without contrast was unremarkable.  The patient was started on prednisone 60 mg daily, which resulted in initial improvement of the optic disc edema, no longer on prednisone. The patient also underwent PSTK, with no improvement of disc edema.  He was therefore referred to neuro-ophthalmology for further evaluation and management.    Today, the patient reports his vision is doing well. He does not notice any visual field defect or changes in his vision. No other major concerns today. Denies diplopia, changes in color vision, floaters, flashes, eye pain. Some light sensitivity related to migraines.     PMHx: migraines, on injection once a month emgality, last injection just under a month ago.     Family:  No family history of glaucoma, autoimmune disease, macular/retinal disease. Younger brother and sister has severe optic nerve drusen.     Social:  Non-smoker  No substance use  No alcohol use  Working as  - has not noticed any vision problems while driving    Imaging:  OCT: supratemporal thinning left eye, stable from prior. right eye within normal limits  G top: reliable; right eye within normal limits. left eye with  inferior defect MD -3 (last visit -5.9)    Exam:  Corrected distance visual acuity was 20/20 in the right eye and 20/20 -1 in the left eye. Intraocular pressure was 12 in the right eye and 13 in the left eye using ICare.  Color vision 11/11 right eye and 11/11 left eye.  Pupils no APD.  Anterior segment exam unremarkable.  Fundus exam: no disc edema, sharp margins.       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 Exam is largely stable today. OCT and visual field stable from prior.     Plan: can continue to follow with regular ophthalmologist (Dr. Morris)  I am always happy to see Donn back for new concerns but I did not make a follow up appointment for him today.                Attending Physician Attestation:  Complete documentation of historical and exam elements from today's encounter can be found in the full encounter summary report (not reduplicated in this progress note).  I personally obtained the chief complaint(s) and history of present illness.  I confirmed and edited as necessary the review of systems, past medical/surgical history, family history, social history, and examination findings as documented by others; and I examined the patient myself.  I personally reviewed the relevant tests, images, and reports as documented above.  I formulated and edited as necessary the assessment and plan and discussed the findings and management plan with the patient and family. - Chente Meier, MS 4